# Patient Record
Sex: FEMALE
[De-identification: names, ages, dates, MRNs, and addresses within clinical notes are randomized per-mention and may not be internally consistent; named-entity substitution may affect disease eponyms.]

---

## 2018-03-05 LAB
ALBUMIN SERPL-MCNC: 4 G/DL (ref 3.5–5)
ALBUMIN/GLOB SERPL: 1.1 {RATIO} (ref 0.8–2)
ALP SERPL-CCNC: 56 IU/L (ref 40–150)
ALT SERPL-CCNC: 18 IU/L (ref 0–55)
ANION GAP SERPL CALC-SCNC: 16.6 MMOL/L (ref 8–16)
BASOPHILS # BLD AUTO: 0 10*3/UL (ref 0–0.1)
BASOPHILS NFR BLD AUTO: 0.3 % (ref 0–1)
BUN SERPL-MCNC: 9 MG/DL (ref 7–26)
BUN/CREAT SERPL: 11 (ref 6–25)
CALCIUM SERPL-MCNC: 9 MG/DL (ref 8.4–10.2)
CHLORIDE SERPL-SCNC: 99 MMOL/L (ref 98–107)
CHOLEST SERPL-MCNC: 156 MD/DL (ref 0–199)
CHOLEST/HDLC SERPL: 3 {RATIO} (ref 3–3.6)
CO2 SERPL-SCNC: 25 MMOL/L (ref 22–29)
DEPRECATED INR PLAS: 1.04
DEPRECATED NEUTROPHILS # BLD AUTO: 4.8 10*3/UL (ref 2.1–6.9)
EGFRCR SERPLBLD CKD-EPI 2021: > 60 ML/MIN (ref 60–?)
EOSINOPHIL # BLD AUTO: 0.1 10*3/UL (ref 0–0.4)
EOSINOPHIL NFR BLD AUTO: 1 % (ref 0–6)
ERYTHROCYTE [DISTWIDTH] IN CORD BLOOD: 12.8 % (ref 11.7–14.4)
GLOBULIN PLAS-MCNC: 3.7 G/DL (ref 2.3–3.5)
GLUCOSE SERPLBLD-MCNC: 145 MG/DL (ref 74–118)
HCT VFR BLD AUTO: 37.7 % (ref 34.2–44.1)
HDLC SERPL-MSCNC: 52 MG/DL (ref 40–60)
HGB BLD-MCNC: 12.8 G/DL (ref 12–16)
LDLC SERPL CALC-MCNC: 82 MG/DL (ref 60–130)
LYMPHOCYTES # BLD: 2.5 10*3/UL (ref 1–3.2)
LYMPHOCYTES NFR BLD AUTO: 32 % (ref 18–39.1)
MCH RBC QN AUTO: 30.7 PG (ref 28–32)
MCHC RBC AUTO-ENTMCNC: 34 G/DL (ref 31–35)
MCV RBC AUTO: 90.4 FL (ref 81–99)
MONOCYTES # BLD AUTO: 0.4 10*3/UL (ref 0.2–0.8)
MONOCYTES NFR BLD AUTO: 5 % (ref 4.4–11.3)
NEUTS SEG NFR BLD AUTO: 61.6 % (ref 38.7–80)
PLATELET # BLD AUTO: 293 X10E3/UL (ref 140–360)
POTASSIUM SERPL-SCNC: 3.6 MMOL/L (ref 3.5–5.1)
PROTHROMBIN TIME: 12.8 SECONDS (ref 11.9–14.5)
RBC # BLD AUTO: 4.17 X10E6/UL (ref 3.6–5.1)
SODIUM SERPL-SCNC: 137 MMOL/L (ref 136–145)
TRIGL SERPL-MCNC: 111 MG/DL (ref 0–149)

## 2018-03-06 ENCOUNTER — HOSPITAL ENCOUNTER (OUTPATIENT)
Dept: HOSPITAL 88 - CATH LAB | Age: 68
Discharge: HOME | End: 2018-03-06
Attending: INTERNAL MEDICINE
Payer: MEDICARE

## 2018-03-06 VITALS — DIASTOLIC BLOOD PRESSURE: 79 MMHG | SYSTOLIC BLOOD PRESSURE: 139 MMHG

## 2018-03-06 VITALS — DIASTOLIC BLOOD PRESSURE: 87 MMHG | SYSTOLIC BLOOD PRESSURE: 161 MMHG

## 2018-03-06 VITALS — WEIGHT: 135 LBS | BODY MASS INDEX: 26.5 KG/M2 | HEIGHT: 60 IN

## 2018-03-06 VITALS — SYSTOLIC BLOOD PRESSURE: 146 MMHG | DIASTOLIC BLOOD PRESSURE: 81 MMHG

## 2018-03-06 VITALS — DIASTOLIC BLOOD PRESSURE: 83 MMHG | SYSTOLIC BLOOD PRESSURE: 149 MMHG

## 2018-03-06 VITALS — DIASTOLIC BLOOD PRESSURE: 79 MMHG | SYSTOLIC BLOOD PRESSURE: 162 MMHG

## 2018-03-06 DIAGNOSIS — J45.909: ICD-10-CM

## 2018-03-06 DIAGNOSIS — D50.9: ICD-10-CM

## 2018-03-06 DIAGNOSIS — I25.119: Primary | ICD-10-CM

## 2018-03-06 DIAGNOSIS — I10: ICD-10-CM

## 2018-03-06 DIAGNOSIS — R94.39: ICD-10-CM

## 2018-03-06 DIAGNOSIS — Z01.812: ICD-10-CM

## 2018-03-06 DIAGNOSIS — E11.9: ICD-10-CM

## 2018-03-06 PROCEDURE — 77002 NEEDLE LOCALIZATION BY XRAY: CPT

## 2018-03-06 PROCEDURE — 85610 PROTHROMBIN TIME: CPT

## 2018-03-06 PROCEDURE — 80053 COMPREHEN METABOLIC PANEL: CPT

## 2018-03-06 PROCEDURE — 36140 INTRO NDL ICATH UPR/LXTR ART: CPT

## 2018-03-06 PROCEDURE — 36415 COLL VENOUS BLD VENIPUNCTURE: CPT

## 2018-03-06 PROCEDURE — 80061 LIPID PANEL: CPT

## 2018-03-06 PROCEDURE — 85025 COMPLETE CBC W/AUTO DIFF WBC: CPT

## 2018-03-06 PROCEDURE — 93458 L HRT ARTERY/VENTRICLE ANGIO: CPT

## 2018-03-06 NOTE — OPERATIVE REPORT
DATE OF PROCEDURE:  March 06, 2018

 

INDICATIONS:  Coronary artery disease.  Abnormal stress test with apical 

ischemia.



PROCEDURES PERFORMED

1. Left heart catheterization, selective coronary angiography, left 

ventriculography.

2. Deployment of right groin Perclose.



COMPLICATIONS:  None.



RECOMMENDATIONS:  Aggressive medical therapy.



Access was obtained in the right femoral artery.  A 6-Argent[place] sheath was 

placed.  Diagnostic coronary angiogram revealed patent left main.  Left 

anterior descending artery 50% stenosis.  First diagonal branch with 50% to 

60% proximal stenosis.  Circumflex 50% stenosis, tandem lesions.  Right 

coronary artery 50% stenosis, tandem lesions.



No critical stenosis or occlusions were noted.  No intervention deemed 

necessary.



LV ejection fraction 65%.  LV end-diastolic pressure 12.  No gradient 

across the aortic valve on pullback.



Right groin repaired using Perclose.  Patient discharged home the same day.







DD:  03/06/2018 10:33

DT:  03/06/2018 11:10

Job#:  [id number]

## 2018-08-27 ENCOUNTER — HOSPITAL ENCOUNTER (OUTPATIENT)
Dept: HOSPITAL 88 - RAD | Age: 68
End: 2018-08-27
Attending: INTERNAL MEDICINE
Payer: MEDICARE

## 2018-08-27 DIAGNOSIS — R63.4: Primary | ICD-10-CM

## 2018-08-27 DIAGNOSIS — E11.649: ICD-10-CM

## 2018-08-27 PROCEDURE — 71046 X-RAY EXAM CHEST 2 VIEWS: CPT

## 2018-08-27 NOTE — DIAGNOSTIC IMAGING REPORT
PROCEDURE:

Frontal and lateral views of the chest.

 

COMPARISON: None.

 

INDICATIONS:   WEIGHT LOSS

     

FINDINGS:

Lines/tubes:  None.

 

Lungs:  The lungs are well inflated and clear. There is no evidence of 

pneumonia or pulmonary edema.

 

Pleura:  There is no pleural effusion or pneumothorax.

 

Heart and mediastinum:  The heart and the mediastinum are normal.

 

Bones:  No acute bony abnormality.

 

IMPRESSION: 

 

1.  No acute cardiopulmonary disease.

 

Dictated by:  Rafy Sheets M.D. on 8/27/2018 at 12:08     

Electronically approved by:  Rafy Sheets M.D. on 8/27/2018 at 12:08

## 2018-11-30 ENCOUNTER — HOSPITAL ENCOUNTER (OUTPATIENT)
Dept: HOSPITAL 88 - OR | Age: 68
Discharge: HOME | End: 2018-11-30
Attending: SURGERY
Payer: MEDICARE

## 2018-11-30 VITALS — SYSTOLIC BLOOD PRESSURE: 167 MMHG | DIASTOLIC BLOOD PRESSURE: 77 MMHG

## 2018-11-30 DIAGNOSIS — I10: ICD-10-CM

## 2018-11-30 DIAGNOSIS — K76.0: ICD-10-CM

## 2018-11-30 DIAGNOSIS — K21.9: ICD-10-CM

## 2018-11-30 DIAGNOSIS — K80.10: Primary | ICD-10-CM

## 2018-11-30 DIAGNOSIS — Z79.84: ICD-10-CM

## 2018-11-30 DIAGNOSIS — E11.9: ICD-10-CM

## 2018-11-30 LAB
ALBUMIN SERPL-MCNC: 4.1 G/DL (ref 3.5–5)
ALBUMIN/GLOB SERPL: 1.2 {RATIO} (ref 0.8–2)
ALP SERPL-CCNC: 46 IU/L (ref 40–150)
ALT SERPL-CCNC: 15 IU/L (ref 0–55)
ANION GAP SERPL CALC-SCNC: 14.1 MMOL/L (ref 8–16)
BASOPHILS # BLD AUTO: 0 10*3/UL (ref 0–0.1)
BASOPHILS NFR BLD AUTO: 0.4 % (ref 0–1)
BILIRUB UR QL: NEGATIVE
BUN SERPL-MCNC: 15 MG/DL (ref 7–26)
BUN/CREAT SERPL: 18 (ref 6–25)
CALCIUM SERPL-MCNC: 9.1 MG/DL (ref 8.4–10.2)
CHLORIDE SERPL-SCNC: 106 MMOL/L (ref 98–107)
CLARITY UR: (no result)
CO2 SERPL-SCNC: 22 MMOL/L (ref 22–29)
COLOR UR: YELLOW
DEPRECATED NEUTROPHILS # BLD AUTO: 2.5 10*3/UL (ref 2.1–6.9)
EGFRCR SERPLBLD CKD-EPI 2021: > 60 ML/MIN (ref 60–?)
EOSINOPHIL # BLD AUTO: 0.1 10*3/UL (ref 0–0.4)
EOSINOPHIL NFR BLD AUTO: 1.8 % (ref 0–6)
ERYTHROCYTE [DISTWIDTH] IN CORD BLOOD: 14 % (ref 11.7–14.4)
GLOBULIN PLAS-MCNC: 3.5 G/DL (ref 2.3–3.5)
GLUCOSE SERPLBLD-MCNC: 97 MG/DL (ref 74–118)
HCT VFR BLD AUTO: 36.9 % (ref 34.2–44.1)
HGB BLD-MCNC: 11.9 G/DL (ref 12–16)
KETONES UR QL STRIP.AUTO: NEGATIVE
LEUKOCYTE ESTERASE UR QL STRIP.AUTO: (no result)
LYMPHOCYTES # BLD: 2.6 10*3/UL (ref 1–3.2)
LYMPHOCYTES NFR BLD AUTO: 47.3 % (ref 18–39.1)
MCH RBC QN AUTO: 31.8 PG (ref 28–32)
MCHC RBC AUTO-ENTMCNC: 32.2 G/DL (ref 31–35)
MCV RBC AUTO: 98.7 FL (ref 81–99)
MONOCYTES # BLD AUTO: 0.4 10*3/UL (ref 0.2–0.8)
MONOCYTES NFR BLD AUTO: 6.3 % (ref 4.4–11.3)
NEUTS SEG NFR BLD AUTO: 44 % (ref 38.7–80)
NITRITE UR QL STRIP.AUTO: NEGATIVE
PLATELET # BLD AUTO: 305 X10E3/UL (ref 140–360)
POTASSIUM SERPL-SCNC: 3.1 MMOL/L (ref 3.5–5.1)
PROT UR QL STRIP.AUTO: NEGATIVE
RBC # BLD AUTO: 3.74 X10E6/UL (ref 3.6–5.1)
SODIUM SERPL-SCNC: 139 MMOL/L (ref 136–145)
SP GR UR STRIP: 1.01 (ref 1.01–1.02)
UROBILINOGEN UR STRIP-MCNC: 0.2 MG/DL (ref 0.2–1)

## 2018-11-30 PROCEDURE — 81003 URINALYSIS AUTO W/O SCOPE: CPT

## 2018-11-30 PROCEDURE — 80053 COMPREHEN METABOLIC PANEL: CPT

## 2018-11-30 PROCEDURE — 88304 TISSUE EXAM BY PATHOLOGIST: CPT

## 2018-11-30 PROCEDURE — 93005 ELECTROCARDIOGRAM TRACING: CPT

## 2018-11-30 PROCEDURE — 74300 X-RAY BILE DUCTS/PANCREAS: CPT

## 2018-11-30 PROCEDURE — 47563 LAPARO CHOLECYSTECTOMY/GRAPH: CPT

## 2018-11-30 PROCEDURE — 82948 REAGENT STRIP/BLOOD GLUCOSE: CPT

## 2018-11-30 PROCEDURE — 85025 COMPLETE CBC W/AUTO DIFF WBC: CPT

## 2018-11-30 PROCEDURE — 36415 COLL VENOUS BLD VENIPUNCTURE: CPT

## 2018-11-30 NOTE — DIAGNOSTIC IMAGING REPORT
Examination: Intraoperative cholangiogram.



Clinical history: Cholecystectomy.



Fluoroscopy time: 17 seconds



Air kerma:  2.96 mGy



Technique: Unknown quantity or type of contrast material was injected into the

biliary system in the operating room without a radiologist present. 8

fluoroscopic spot images are submitted.



Findings: Opacification of the common bile duct without filling defect. Transit

of contrast into the small bowel is noted. There is partial opacification of

the nondilated intrahepatic biliary system along with significant presumed

intraperitoneal staining of contrast along the rodolfo hepatis and right liver

edge.



Impression:



No filling defects are identified within the common bile duct to suggest

presence of choledocholithiasis. Other findings as above.



Signed by: Dr. Eduardo Smith M.D. on 11/30/2018 3:24 PM

## 2018-11-30 NOTE — OPERATIVE REPORT
DATE OF PROCEDURE:  November 30, 2018 



PREOPERATIVE DIAGNOSES 

1.  Cholecystitis and cholelithiasis.  

2.  Rule out common bile duct stone.



POSTOPERATIVE DIAGNOSES 

1.  Cholecystitis and cholelithiasis.  

2.  No common bile duct stones.



OPERATIONS PERFORMED

1.  Laparoscopic cholecystectomy.  

2.  Intraoperative cholangiograms.  



ASSISTANT:  Dr. Dangelo Samuel and DAYDAY Hallman.



ANESTHESIA:  General.



COMPLICATIONS:  None.



ESTIMATED BLOOD LOSS:  Minimal.



DESCRIPTION OF PROCEDURE:  With the patient lying in bed in the supine 

position under good general endotracheal anesthesia, the abdomen was 

prepped with Betadine solution and draped in the usual manner.  A Veress 

needle was introduced into the umbilicus and pneumoperitoneum was 

established without any difficulty.  An 11-mm trocar was placed into the 

umbilicus and a 10 mm video laparoscope was placed into the intra-abdominal 

cavity.  Under direct vision, three 5-mm trocars were placed in the right 

subcostal region.  Video laparoscopy at this point revealed a gallbladder 

that was rather distended.  The liver had some changes of fatty 

infiltration.  The rest of the abdominal exploration was otherwise within 

normal limits.  The adhesions to the gallbladder were then slowly and 

carefully taken down, and the peritoneum overlying the neck of the 

gallbladder was then opened and the cystic duct was identified.  Cystic 

duct was then followed to its junction with the common duct.  The common 

duct appeared to be somewhat enlarged, so we decided that a cholangiogram 

would be necessary.  The cystic duct was then circumferentially dissected 

away from the common duct, and a clip was placed at the neck of the 

gallbladder.  A small opening was then made into the cystic duct.  The 

cholangiocath was introduced into the cystic duct under fluoroscopic 

guidance.  Half-strength dye was introduced into the biliary tree.  This 

showed free flow of dye into the duodenum.  A somewhat larger than normal 

common bile duct, but there were no stones in the biliary tree.  There was 

free flow of dye into the duodenum.  The cholangiocath was then removed.  

The cystic duct was then doubly clipped and divided.  The cystic artery was 

similarly doubly clipped and divided.  Gallbladder was then slowly and 

carefully taken off the liver bed using the cautery scissors and perfect 

hemostasis was ascertained.  The gallbladder was then grasped through the 

umbilical port and removed without any difficulty.  Video laparoscopy was 

then again carried out.  The liver bed was found to be perfectly dry.  All 

the excess fluid was aspirated.  The pneumoperitoneum was evacuated and all 

the trocars were removed under direct vision.  The midline fascia at the 

umbilicus was then closed with a figure-of-eight of 0 Vicryl.  All layers 

were infiltrated on the way out with a solution of 0.25% Marcaine.  

Subcutaneous tissue was approximated with 3-0 Vicryl.  The skin was closed 

with subcuticular 5-0 Vicryl.  Benzoin, Steri-Strips and Band-Aids were 

applied.  The sponge, lap and needle count was correct.  The patient 

tolerated the procedure well and returned to the recovery room in stable 

condition.



  









DD:  11/30/2018 15:49

DT:  11/30/2018 16:00

Job#:  [id number]

## 2018-11-30 NOTE — XMS REPORT
Patient Summary Document

                             Created on: 2018



OBIE LEVIN

External Reference #: [id number]

: 1950

Sex: Female



Demographics







                          Address                   [address] [place] [address]

 

                          Home Phone                [phone]

 

                          Preferred Language        Unknown

 

                          Marital Status            Unknown

 

                          P[employer]ec[employer]alism Affiliation     Unknown

 

                          Race                      Unknown

 

                                        Additional Race(s)  

 

                          Ethnic Group              Unknown





Author







                          Author                    [facility]

 

                          Address                   Unknown

 

                          Phone                     Unavailable







Care Team Providers







                    Care Team Member Name    Role                Phone

 

                    AISHA VELEZ     Unavailable         Unavailable







Problems

This patient has no known problems.



Allergies, Adverse Reactions, Alerts

This patient has no known allergies or adverse reactions.



Medications

This patient has no known medications.



Results







           Test Description    Test Time    Test Comments    Text Results    Atomic Results    Result

 Comments

 

                CHEST 2 VIEWS    2018 12:08:00                        [place] [address]      Patient Name: 
OBIE LEVIN   MR #: [id number]    : 1950 Age/Sex: 67/F  Acct #: 
[id number] Req #: [id number]  Adm Physician:     Ordered by: AISHA VELEZ MD
 Report #: [id number]   Location: [facility]  Room/Bed:     
______________________________________________________________________________
_____________________    Procedure: [id number] DX/CHEST 2 VIEWS  Exam Date:      
                      Exam Time:        REPORT STATUS: Signed    PROCEDURE:   
Frontal and lateral views of the chest.       COMPARISON: None.       
INDICATIONS:   WEIGHT LOSS           FINDINGS:   Lines/tubes:  None.       
Lungs:  The lungs are well inflated and clear. There is no evidence of    
pneumonia or pulmonary edema.       Pleura:  There is no pleural effusion or 
pneumothorax.       Heart and mediastinum:  The heart and the mediastinum are 
normal.       Bones:  No acute bony abnormality.       IMPRESSION:        1.  No
acute cardiopulmonary disease.       Dictated by:  Rafy Sheets M.D. on 
2018 at 12:08        Electronically approved by:  Rafy Sheets M.D. on 
2018 at 12:08                   Dictated By: RAFY SHEETS MD  
Electronically Signed By: RAFY SHEETS MD on 18  Transcribed By: 
ANNY on 18       COPY TO:   AISHA VELEZ MD

## 2020-06-08 LAB
ANION GAP SERPL CALC-SCNC: 15.8 MMOL/L (ref 8–16)
BASOPHILS # BLD AUTO: 0 10*3/UL (ref 0–0.1)
BASOPHILS NFR BLD AUTO: 0.2 % (ref 0–1)
BUN SERPL-MCNC: 18 MG/DL (ref 7–26)
BUN/CREAT SERPL: 17 (ref 6–25)
CALCIUM SERPL-MCNC: 8.9 MG/DL (ref 8.4–10.2)
CHLORIDE SERPL-SCNC: 106 MMOL/L (ref 98–107)
CO2 SERPL-SCNC: 21 MMOL/L (ref 22–29)
DEPRECATED NEUTROPHILS # BLD AUTO: 3.3 10*3/UL (ref 2.1–6.9)
EGFRCR SERPLBLD CKD-EPI 2021: 50 ML/MIN (ref 60–?)
EOSINOPHIL # BLD AUTO: 0.2 10*3/UL (ref 0–0.4)
EOSINOPHIL NFR BLD AUTO: 2.8 % (ref 0–6)
ERYTHROCYTE [DISTWIDTH] IN CORD BLOOD: 13.6 % (ref 11.7–14.4)
GLUCOSE SERPLBLD-MCNC: 120 MG/DL (ref 74–118)
HCT VFR BLD AUTO: 32.1 % (ref 34.2–44.1)
HGB BLD-MCNC: 10 G/DL (ref 12–16)
LYMPHOCYTES # BLD: 1.9 10*3/UL (ref 1–3.2)
LYMPHOCYTES NFR BLD AUTO: 33.4 % (ref 18–39.1)
MCH RBC QN AUTO: 29.4 PG (ref 28–32)
MCHC RBC AUTO-ENTMCNC: 31.2 G/DL (ref 31–35)
MCV RBC AUTO: 94.4 FL (ref 81–99)
MONOCYTES # BLD AUTO: 0.4 10*3/UL (ref 0.2–0.8)
MONOCYTES NFR BLD AUTO: 6 % (ref 4.4–11.3)
NEUTS SEG NFR BLD AUTO: 57.4 % (ref 38.7–80)
PLATELET # BLD AUTO: 201 X10E3/UL (ref 140–360)
POTASSIUM SERPL-SCNC: 3.8 MMOL/L (ref 3.5–5.1)
RBC # BLD AUTO: 3.4 X10E6/UL (ref 3.6–5.1)
SODIUM SERPL-SCNC: 139 MMOL/L (ref 136–145)

## 2020-06-08 NOTE — DIAGNOSTIC IMAGING REPORT
EXAM:  CHEST 2 VIEWS



DATE: 6/8/2020 12:55 PM  



INDICATION: Preoperative evaluation 



COMPARISON: None



FINDINGS:

The trachea is midline. The lungs are symmetrically expanded without evidence

for large focal consolidation, pneumothorax, or significant pleural effusion.



The cardiomediastinal silhouette and pulmonary vasculature are within normal

limits. Partially visualized neurostimulator leads noted terminating within the

mid thoracic spine. No acute osseous abnormality is identified. The surrounding

soft tissues are unremarkable. Cholecystectomy clips noted within the right

upper quadrant.





IMPRESSION:



No acute cardiopulmonary process identified.



Signed by: Dr. Kevon Dao MD on 6/8/2020 1:30 PM

## 2020-06-11 ENCOUNTER — HOSPITAL ENCOUNTER (OUTPATIENT)
Dept: HOSPITAL 88 - OR | Age: 70
Discharge: HOME | End: 2020-06-11
Attending: SPECIALIST
Payer: MEDICARE

## 2020-06-11 VITALS — SYSTOLIC BLOOD PRESSURE: 156 MMHG | DIASTOLIC BLOOD PRESSURE: 84 MMHG

## 2020-06-11 DIAGNOSIS — M54.9: ICD-10-CM

## 2020-06-11 DIAGNOSIS — Z01.812: ICD-10-CM
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
## 2020-06-11 DIAGNOSIS — Z11.59: ICD-10-CM

## 2020-06-11 DIAGNOSIS — E11.9: ICD-10-CM

## 2020-06-11 DIAGNOSIS — Z01.818: ICD-10-CM

## 2020-06-11 DIAGNOSIS — D64.9: ICD-10-CM

## 2020-06-11 DIAGNOSIS — Z01.810: ICD-10-CM

## 2020-06-11 DIAGNOSIS — S52.501A: Primary | ICD-10-CM

## 2020-06-11 DIAGNOSIS — Z79.84: ICD-10-CM
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
## 2020-06-11 PROCEDURE — 36415 COLL VENOUS BLD VENIPUNCTURE: CPT

## 2020-06-11 PROCEDURE — 71046 X-RAY EXAM CHEST 2 VIEWS: CPT

## 2020-06-11 PROCEDURE — 87635 SARS-COV-2 COVID-19 AMP PRB: CPT

## 2020-06-11 PROCEDURE — 25606 PERQ SKEL FIXJ DSTL RDL FX: CPT

## 2020-06-11 PROCEDURE — 82948 REAGENT STRIP/BLOOD GLUCOSE: CPT

## 2020-06-11 PROCEDURE — 93005 ELECTROCARDIOGRAM TRACING: CPT

## 2020-06-11 PROCEDURE — 76000 FLUOROSCOPY <1 HR PHYS/QHP: CPT

## 2020-06-11 PROCEDURE — 80048 BASIC METABOLIC PNL TOTAL CA: CPT

## 2020-06-11 PROCEDURE — 85025 COMPLETE CBC W/AUTO DIFF WBC: CPT

## 2020-06-11 NOTE — OPERATIVE REPORT
DATE OF PROCEDURE:  06/11/2020

 

SURGEON:  Paresh Wilcox MD

 

PREOPERATIVE DIAGNOSIS:  Displaced right distal radius fracture.

 

POSTOPERATIVE DIAGNOSIS:  Displaced right distal radius fracture.

 

OPERATION PROCEDURE PERFORMED:  The patient underwent a closed reduction and

percutaneous pinning of the right distal radius fracture. 

 

ASSISTANT:  None.

 

ANESTHESIA:  General endotracheal intubation anesthesia.

 

IV FLUIDS:  Per the Anesthesia record.

 

BRIEF DESCRIPTION OF THE PATIENT'S OPERATIVE PROCEDURE:  Ms. Castanon was taken to the

operating room, placed in the supine position on the operating table.  Following

induction of anesthesia as well as endotracheal intubation, the patient's right upper

extremity was examined under anesthesia.  She was found to have bruising and ecchymosis

involving the right wrist joint.  Fluoroscopic evaluation of the patient's right wrist

demonstrated a distal radius fracture with significant dorsal displacement.  The

patient's upper extremity was prepped and draped in a standard surgical fashion.  The

case was begun by manipulating the wrist carefully in a closed fashion.  This resulted

in acceptable realignment of the patient's wrist injury.  Two pins were inserted from

distal to proximal transfixing the fracture in its reduced position.  This resulted in

realignment of the wrist with minimal dorsal translation of the distal radius in

relation to the shaft of the radius.  The pins were then dressed sterilely.  Sterile

dressings were applied and the patient was provided a sugar-tong splint.  She was then

awakened and taken to the postanesthesia care in stable condition. 

 

 

 

 

______________________________

MD MUKESH Cobb/MODL

D:  06/11/2020 13:25:57

T:  06/11/2020 18:28:13

Job #:  [id number]

## 2020-08-28 ENCOUNTER — HOSPITAL ENCOUNTER (OUTPATIENT)
Dept: HOSPITAL 88 - OT | Age: 70
LOS: 3 days | End: 2020-08-31
Attending: SPECIALIST
Payer: MEDICARE

## 2020-08-28 DIAGNOSIS — M25.531: ICD-10-CM

## 2020-08-28 DIAGNOSIS — R53.1: ICD-10-CM

## 2020-08-28 DIAGNOSIS — M25.631: ICD-10-CM

## 2020-08-28 DIAGNOSIS — S52.501D: Primary | ICD-10-CM

## 2020-09-08 ENCOUNTER — HOSPITAL ENCOUNTER (OUTPATIENT)
Dept: HOSPITAL 88 - OT | Age: 70
LOS: 22 days | End: 2020-09-30
Attending: SPECIALIST
Payer: MEDICARE
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
## 2020-09-08 DIAGNOSIS — R53.1: ICD-10-CM

## 2020-09-08 DIAGNOSIS — M25.531: ICD-10-CM

## 2020-09-08 DIAGNOSIS — M25.631: ICD-10-CM
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
## 2020-09-08 DIAGNOSIS — S52.501D: Primary | ICD-10-CM

## 2021-04-07 ENCOUNTER — HOSPITAL ENCOUNTER (EMERGENCY)
Dept: HOSPITAL 88 - ER | Age: 71
LOS: 1 days | Discharge: HOME | End: 2021-04-08
Payer: MEDICARE

## 2021-04-07 VITALS — BODY MASS INDEX: 26.5 KG/M2 | HEIGHT: 60 IN | WEIGHT: 135 LBS

## 2021-04-07 DIAGNOSIS — I10: ICD-10-CM

## 2021-04-07 DIAGNOSIS — E11.65: ICD-10-CM

## 2021-04-07 DIAGNOSIS — E78.5: ICD-10-CM

## 2021-04-07 DIAGNOSIS — R94.31: ICD-10-CM

## 2021-04-07 DIAGNOSIS — N39.0: ICD-10-CM

## 2021-04-07 DIAGNOSIS — R10.9: Primary | ICD-10-CM

## 2021-04-07 DIAGNOSIS — Z20.822: ICD-10-CM

## 2021-04-07 DIAGNOSIS — F32.9: ICD-10-CM

## 2021-04-07 LAB
ALBUMIN SERPL-MCNC: 4.3 G/DL (ref 3.5–5)
ALBUMIN/GLOB SERPL: 1.2 {RATIO} (ref 0.8–2)
ALP SERPL-CCNC: 65 IU/L (ref 40–150)
ALT SERPL-CCNC: 15 IU/L (ref 0–55)
AMPHETAMINES UR QL SCN>1000 NG/ML: NEGATIVE
AMYLASE SERPL-CCNC: 82 U/L (ref 25–125)
ANION GAP SERPL CALC-SCNC: 16.9 MMOL/L (ref 8–16)
BACTERIA URNS QL MICRO: (no result) /HPF
BASOPHILS # BLD AUTO: 0 10*3/UL (ref 0–0.1)
BASOPHILS NFR BLD AUTO: 0.1 % (ref 0–1)
BENZODIAZ UR QL SCN: NEGATIVE
BUN SERPL-MCNC: 15 MG/DL (ref 7–26)
BUN/CREAT SERPL: 17 (ref 6–25)
CALCIUM SERPL-MCNC: 9.4 MG/DL (ref 8.4–10.2)
CHLORIDE SERPL-SCNC: 108 MMOL/L (ref 98–107)
CK MB SERPL-MCNC: 1.2 NG/ML (ref 0–5)
CK SERPL-CCNC: 108 IU/L (ref 29–168)
CLARITY UR: CLEAR
CO2 SERPL-SCNC: 22 MMOL/L (ref 22–29)
COLOR UR: YELLOW
DEPRECATED NEUTROPHILS # BLD AUTO: 5.4 10*3/UL (ref 2.1–6.9)
DEPRECATED RBC URNS MANUAL-ACNC: (no result) /HPF (ref 0–5)
EGFRCR SERPLBLD CKD-EPI 2021: > 60 ML/MIN (ref 60–?)
EOSINOPHIL # BLD AUTO: 0 10*3/UL (ref 0–0.4)
EOSINOPHIL NFR BLD AUTO: 0.1 % (ref 0–6)
EPI CELLS URNS QL MICRO: (no result) /LPF
ERYTHROCYTE [DISTWIDTH] IN CORD BLOOD: 14.3 % (ref 11.7–14.4)
GLOBULIN PLAS-MCNC: 3.7 G/DL (ref 2.3–3.5)
GLUCOSE SERPLBLD-MCNC: 146 MG/DL (ref 74–118)
HCT VFR BLD AUTO: 32.8 % (ref 34.2–44.1)
HGB BLD-MCNC: 10.2 G/DL (ref 12–16)
KETONES UR QL STRIP.AUTO: (no result)
LEUKOCYTE ESTERASE UR QL STRIP.AUTO: (no result)
LIPASE SERPL-CCNC: 59 U/L (ref 8–78)
LYMPHOCYTES # BLD: 1.6 10*3/UL (ref 1–3.2)
LYMPHOCYTES NFR BLD AUTO: 22 % (ref 18–39.1)
MCH RBC QN AUTO: 27.3 PG (ref 28–32)
MCHC RBC AUTO-ENTMCNC: 31.1 G/DL (ref 31–35)
MCV RBC AUTO: 87.9 FL (ref 81–99)
MONOCYTES # BLD AUTO: 0.4 10*3/UL (ref 0.2–0.8)
MONOCYTES NFR BLD AUTO: 4.7 % (ref 4.4–11.3)
NEUTS SEG NFR BLD AUTO: 72.8 % (ref 38.7–80)
NITRITE UR QL STRIP.AUTO: NEGATIVE
PCP UR QL SCN: NEGATIVE
PLATELET # BLD AUTO: 335 X10E3/UL (ref 140–360)
POTASSIUM SERPL-SCNC: 3.9 MMOL/L (ref 3.5–5.1)
PROT UR QL STRIP.AUTO: (no result)
RBC # BLD AUTO: 3.73 X10E6/UL (ref 3.6–5.1)
SODIUM SERPL-SCNC: 143 MMOL/L (ref 136–145)
SP GR UR STRIP: 1.01 (ref 1.01–1.02)
UROBILINOGEN UR STRIP-MCNC: 0.2 MG/DL (ref 0.2–1)
WBC #/AREA URNS HPF: (no result) /HPF (ref 0–5)

## 2021-04-07 PROCEDURE — 71045 X-RAY EXAM CHEST 1 VIEW: CPT

## 2021-04-07 PROCEDURE — 80053 COMPREHEN METABOLIC PANEL: CPT

## 2021-04-07 PROCEDURE — 82150 ASSAY OF AMYLASE: CPT

## 2021-04-07 PROCEDURE — 99284 EMERGENCY DEPT VISIT MOD MDM: CPT

## 2021-04-07 PROCEDURE — 93005 ELECTROCARDIOGRAM TRACING: CPT

## 2021-04-07 PROCEDURE — 72125 CT NECK SPINE W/O DYE: CPT

## 2021-04-07 PROCEDURE — 70450 CT HEAD/BRAIN W/O DYE: CPT

## 2021-04-07 PROCEDURE — 82550 ASSAY OF CK (CPK): CPT

## 2021-04-07 PROCEDURE — 80307 DRUG TEST PRSMV CHEM ANLYZR: CPT

## 2021-04-07 PROCEDURE — 74177 CT ABD & PELVIS W/CONTRAST: CPT

## 2021-04-07 PROCEDURE — 84484 ASSAY OF TROPONIN QUANT: CPT

## 2021-04-07 PROCEDURE — 36415 COLL VENOUS BLD VENIPUNCTURE: CPT

## 2021-04-07 PROCEDURE — 85025 COMPLETE CBC W/AUTO DIFF WBC: CPT

## 2021-04-07 PROCEDURE — 83690 ASSAY OF LIPASE: CPT

## 2021-04-07 PROCEDURE — 82553 CREATINE MB FRACTION: CPT

## 2021-04-07 PROCEDURE — 81001 URINALYSIS AUTO W/SCOPE: CPT

## 2021-06-18 ENCOUNTER — HOSPITAL ENCOUNTER (OUTPATIENT)
Dept: HOSPITAL 88 - RAD | Age: 71
End: 2021-06-18
Attending: INTERNAL MEDICINE
Payer: MEDICARE

## 2021-06-18 DIAGNOSIS — R60.9: Primary | ICD-10-CM

## 2021-06-18 PROCEDURE — 71046 X-RAY EXAM CHEST 2 VIEWS: CPT

## 2022-01-07 ENCOUNTER — HOSPITAL ENCOUNTER (OUTPATIENT)
Dept: HOSPITAL 88 - US | Age: 72
End: 2022-01-07
Attending: FAMILY MEDICINE
Payer: MEDICARE

## 2022-01-07 DIAGNOSIS — E04.1: Primary | ICD-10-CM

## 2022-01-07 PROCEDURE — 76536 US EXAM OF HEAD AND NECK: CPT

## 2022-01-24 ENCOUNTER — HOSPITAL ENCOUNTER (OUTPATIENT)
Dept: HOSPITAL 88 - MAMMO | Age: 72
End: 2022-01-24
Attending: FAMILY MEDICINE
Payer: MEDICARE

## 2022-01-24 DIAGNOSIS — Z12.31: Primary | ICD-10-CM

## 2022-01-24 DIAGNOSIS — M85.88: ICD-10-CM

## 2022-01-24 PROCEDURE — 77067 SCR MAMMO BI INCL CAD: CPT

## 2022-01-24 PROCEDURE — 77080 DXA BONE DENSITY AXIAL: CPT

## 2022-01-27 ENCOUNTER — HOSPITAL ENCOUNTER (OUTPATIENT)
Dept: HOSPITAL 88 - US | Age: 72
End: 2022-01-27
Attending: FAMILY MEDICINE
Payer: MEDICARE

## 2022-01-27 DIAGNOSIS — E04.1: Primary | ICD-10-CM

## 2022-01-27 PROCEDURE — 10005 FNA BX W/US GDN 1ST LES: CPT

## 2022-01-27 PROCEDURE — 88112 CYTOPATH CELL ENHANCE TECH: CPT

## 2022-01-27 PROCEDURE — 88305 TISSUE EXAM BY PATHOLOGIST: CPT
